# Patient Record
Sex: MALE | Race: WHITE | NOT HISPANIC OR LATINO | Employment: FULL TIME | URBAN - METROPOLITAN AREA
[De-identification: names, ages, dates, MRNs, and addresses within clinical notes are randomized per-mention and may not be internally consistent; named-entity substitution may affect disease eponyms.]

---

## 2019-03-15 ENCOUNTER — HOSPITAL ENCOUNTER (OUTPATIENT)
Dept: URGENT CARE | Facility: CLINIC | Age: 34
Discharge: HOME | End: 2019-03-15

## 2019-03-17 LAB — BACTERIA SPEC AEROBE CULT: NORMAL

## 2020-07-20 ENCOUNTER — HOSPITAL ENCOUNTER (OUTPATIENT)
Dept: FAMILY MEDICINE CLINIC | Facility: CLINIC | Age: 35
Discharge: HOME | End: 2020-07-20
Attending: FAMILY MEDICINE

## 2020-07-21 LAB — SARS-COV-2 RNA SPEC QL NAA+PROBE: DETECTED

## 2021-11-15 ENCOUNTER — OFFICE VISIT (OUTPATIENT)
Dept: INTERNAL MEDICINE | Facility: CLINIC | Age: 36
End: 2021-11-15

## 2021-11-15 VITALS
DIASTOLIC BLOOD PRESSURE: 76 MMHG | SYSTOLIC BLOOD PRESSURE: 122 MMHG | RESPIRATION RATE: 12 BRPM | TEMPERATURE: 98 F | WEIGHT: 168 LBS | HEIGHT: 71 IN | OXYGEN SATURATION: 98 % | BODY MASS INDEX: 23.52 KG/M2 | HEART RATE: 98 BPM

## 2021-11-15 DIAGNOSIS — Z13.220 SCREENING, LIPID: ICD-10-CM

## 2021-11-15 DIAGNOSIS — R13.10 DYSPHAGIA, UNSPECIFIED TYPE: ICD-10-CM

## 2021-11-15 DIAGNOSIS — K20.0 EOSINOPHILIC ESOPHAGITIS: Primary | ICD-10-CM

## 2021-11-15 DIAGNOSIS — K64.9 HEMORRHOIDS, UNSPECIFIED HEMORRHOID TYPE: ICD-10-CM

## 2021-11-15 DIAGNOSIS — Z11.59 NEED FOR HEPATITIS C SCREENING TEST: ICD-10-CM

## 2021-11-15 LAB
ALBUMIN SERPL-MCNC: 5.2 G/DL (ref 3.5–5.2)
ALBUMIN/GLOB SERPL: 2.2 G/DL
ALP SERPL-CCNC: 68 U/L (ref 39–117)
ALT SERPL W P-5'-P-CCNC: 20 U/L (ref 1–41)
ANION GAP SERPL CALCULATED.3IONS-SCNC: 6.9 MMOL/L (ref 5–15)
AST SERPL-CCNC: 13 U/L (ref 1–40)
BASOPHILS # BLD AUTO: 0.12 10*3/MM3 (ref 0–0.2)
BASOPHILS NFR BLD AUTO: 1.9 % (ref 0–1.5)
BILIRUB SERPL-MCNC: 0.3 MG/DL (ref 0–1.2)
BUN SERPL-MCNC: 9 MG/DL (ref 6–20)
BUN/CREAT SERPL: 9.4 (ref 7–25)
CALCIUM SPEC-SCNC: 10.1 MG/DL (ref 8.6–10.5)
CHLORIDE SERPL-SCNC: 101 MMOL/L (ref 98–107)
CHOLEST SERPL-MCNC: 169 MG/DL (ref 0–200)
CO2 SERPL-SCNC: 29.1 MMOL/L (ref 22–29)
CREAT SERPL-MCNC: 0.96 MG/DL (ref 0.76–1.27)
DEPRECATED RDW RBC AUTO: 39.4 FL (ref 37–54)
EOSINOPHIL # BLD AUTO: 0.33 10*3/MM3 (ref 0–0.4)
EOSINOPHIL NFR BLD AUTO: 5.2 % (ref 0.3–6.2)
ERYTHROCYTE [DISTWIDTH] IN BLOOD BY AUTOMATED COUNT: 12.1 % (ref 12.3–15.4)
GFR SERPL CREATININE-BSD FRML MDRD: 88 ML/MIN/1.73
GLOBULIN UR ELPH-MCNC: 2.4 GM/DL
GLUCOSE SERPL-MCNC: 107 MG/DL (ref 65–99)
HCT VFR BLD AUTO: 46.9 % (ref 37.5–51)
HCV AB SER DONR QL: NORMAL
HDLC SERPL-MCNC: 55 MG/DL (ref 40–60)
HGB BLD-MCNC: 16.1 G/DL (ref 13–17.7)
IMM GRANULOCYTES # BLD AUTO: 0.01 10*3/MM3 (ref 0–0.05)
IMM GRANULOCYTES NFR BLD AUTO: 0.2 % (ref 0–0.5)
LDLC SERPL CALC-MCNC: 97 MG/DL (ref 0–100)
LDLC/HDLC SERPL: 1.74 {RATIO}
LYMPHOCYTES # BLD AUTO: 1.74 10*3/MM3 (ref 0.7–3.1)
LYMPHOCYTES NFR BLD AUTO: 27.4 % (ref 19.6–45.3)
MCH RBC QN AUTO: 30.6 PG (ref 26.6–33)
MCHC RBC AUTO-ENTMCNC: 34.3 G/DL (ref 31.5–35.7)
MCV RBC AUTO: 89.2 FL (ref 79–97)
MONOCYTES # BLD AUTO: 0.38 10*3/MM3 (ref 0.1–0.9)
MONOCYTES NFR BLD AUTO: 6 % (ref 5–12)
NEUTROPHILS NFR BLD AUTO: 3.76 10*3/MM3 (ref 1.7–7)
NEUTROPHILS NFR BLD AUTO: 59.3 % (ref 42.7–76)
NRBC BLD AUTO-RTO: 0 /100 WBC (ref 0–0.2)
PLATELET # BLD AUTO: 288 10*3/MM3 (ref 140–450)
PMV BLD AUTO: 10.5 FL (ref 6–12)
POTASSIUM SERPL-SCNC: 4.3 MMOL/L (ref 3.5–5.2)
PROT SERPL-MCNC: 7.6 G/DL (ref 6–8.5)
RBC # BLD AUTO: 5.26 10*6/MM3 (ref 4.14–5.8)
SODIUM SERPL-SCNC: 137 MMOL/L (ref 136–145)
TRIGL SERPL-MCNC: 91 MG/DL (ref 0–150)
VLDLC SERPL-MCNC: 17 MG/DL (ref 5–40)
WBC # BLD AUTO: 6.34 10*3/MM3 (ref 3.4–10.8)

## 2021-11-15 PROCEDURE — 85025 COMPLETE CBC W/AUTO DIFF WBC: CPT | Performed by: INTERNAL MEDICINE

## 2021-11-15 PROCEDURE — 84443 ASSAY THYROID STIM HORMONE: CPT | Performed by: INTERNAL MEDICINE

## 2021-11-15 PROCEDURE — 80053 COMPREHEN METABOLIC PANEL: CPT | Performed by: INTERNAL MEDICINE

## 2021-11-15 PROCEDURE — 99203 OFFICE O/P NEW LOW 30 MIN: CPT | Performed by: INTERNAL MEDICINE

## 2021-11-15 PROCEDURE — 80061 LIPID PANEL: CPT | Performed by: INTERNAL MEDICINE

## 2021-11-15 PROCEDURE — 86803 HEPATITIS C AB TEST: CPT | Performed by: INTERNAL MEDICINE

## 2021-11-15 NOTE — PROGRESS NOTES
"Chief Complaint  Establish Care and Allergic Rhinitis    Subjective          Sandeep Beavers presents to [facility]  History of Present Illness    Hx of dysphagia  Had EGD/dysphagia  Scope came back as allergies  Was told that he needed to do shots , he wasn't able to at the time due to work    Feels like symptoms are return and he has a bit of trouble and needs to vomit it back up at times    Also dealing with a hemorrhoid  It doesn't really bother him.    Noticed it with wiping  Only bleeds every once     No family hx of colon or prostate cancer        Objective   Vital Signs:   /76   Pulse 98   Temp 98 °F (36.7 °C)   Resp 12   Ht 180.3 cm (71\")   Wt 76.2 kg (168 lb)   SpO2 98%   BMI 23.43 kg/m²     Physical Exam  Vitals reviewed.   Constitutional:       Appearance: Normal appearance. He is well-developed.   HENT:      Head: Normocephalic and atraumatic.      Right Ear: External ear normal.      Left Ear: External ear normal.   Eyes:      Conjunctiva/sclera: Conjunctivae normal.      Pupils: Pupils are equal, round, and reactive to light.   Cardiovascular:      Rate and Rhythm: Normal rate and regular rhythm.      Heart sounds: No murmur heard.  No friction rub. No gallop.    Pulmonary:      Effort: Pulmonary effort is normal.      Breath sounds: Normal breath sounds. No wheezing or rhonchi.   Genitourinary:     Rectum: Normal.      Comments: 0.75cm hemorrhoid that is soft in nature   Skin:     General: Skin is warm and dry.   Neurological:      Mental Status: He is alert and oriented to person, place, and time.      Cranial Nerves: No cranial nerve deficit.   Psychiatric:         Mood and Affect: Affect normal.         Behavior: Behavior normal.         Thought Content: Thought content normal.        Result Review :     Common labs    Common Labsle 11/15/21 11/15/21 11/15/21    1540 1540 1540   Glucose  107 (A)    BUN  9    Creatinine  0.96    eGFR " Non African Am  88    Sodium  137    Potassium  4.3    Chloride  101    Calcium  10.1    Albumin  5.20    Total Bilirubin  0.3    Alkaline Phosphatase  68    AST (SGOT)  13    ALT (SGPT)  20    WBC 6.34     Hemoglobin 16.1     Hematocrit 46.9     Platelets 288     Total Cholesterol   169   Triglycerides   91   HDL Cholesterol   55   LDL Cholesterol    97   (A) Abnormal value               Procedures      Assessment and Plan    Diagnoses and all orders for this visit:    1. Eosinophilic esophagitis (Primary)  Comments:  Will refer to GI for further work-up will also refer to allergy for further work-up  Orders:  -     Ambulatory Referral to Gastroenterology  -     Ambulatory Referral to Allergy  -     Comprehensive Metabolic Panel  -     CBC & Differential  -     TSH    2. Screening, lipid  -     Lipid Panel    3. Need for hepatitis C screening test  -     Hepatitis C Antibody; Future  -     Hepatitis C Antibody    4. Dysphagia, unspecified type  Comments:  May benefit from scope we will see what GI thinks    5. Hemorrhoids, unspecified hemorrhoid type  Comments:  Do not appear irritated right now he will continue to monitor for growth does not appear worrisome at this time              Follow Up   Return in about 2 months (around 1/15/2022).  Patient was given instructions and counseling regarding his condition or for health maintenance advice. Please see specific information pulled into the AVS if appropriate.

## 2021-11-16 LAB — TSH SERPL DL<=0.05 MIU/L-ACNC: 2.01 UIU/ML (ref 0.27–4.2)

## 2021-12-06 ENCOUNTER — PATIENT MESSAGE (OUTPATIENT)
Dept: INTERNAL MEDICINE | Facility: CLINIC | Age: 36
End: 2021-12-06

## 2021-12-07 NOTE — TELEPHONE ENCOUNTER
From: Sandeep Beavers  To: Debbi Preston MD  Sent: 12/6/2021 7:57 AM EST  Subject: Referral for Gastro      Good morning I got scheduled for the gastro Dr last week for December 15 but they scheduled me with Dr Mann instead of Dr. Sanchez. Just wanted to make sure this is ok? Thanks and have a good day.

## 2021-12-15 ENCOUNTER — LAB (OUTPATIENT)
Dept: LAB | Facility: HOSPITAL | Age: 36
End: 2021-12-15

## 2021-12-15 ENCOUNTER — OFFICE VISIT (OUTPATIENT)
Dept: GASTROENTEROLOGY | Facility: CLINIC | Age: 36
End: 2021-12-15

## 2021-12-15 VITALS
BODY MASS INDEX: 25.21 KG/M2 | SYSTOLIC BLOOD PRESSURE: 133 MMHG | HEART RATE: 105 BPM | HEIGHT: 70 IN | DIASTOLIC BLOOD PRESSURE: 83 MMHG | WEIGHT: 176.1 LBS

## 2021-12-15 DIAGNOSIS — K20.0 ESOPHAGITIS, EOSINOPHILIC: ICD-10-CM

## 2021-12-15 DIAGNOSIS — R13.13 PHARYNGEAL DYSPHAGIA: ICD-10-CM

## 2021-12-15 DIAGNOSIS — K62.5 RECTAL BLEEDING: Primary | ICD-10-CM

## 2021-12-15 PROCEDURE — 86003 ALLG SPEC IGE CRUDE XTRC EA: CPT

## 2021-12-15 PROCEDURE — 99204 OFFICE O/P NEW MOD 45 MIN: CPT | Performed by: NURSE PRACTITIONER

## 2021-12-15 PROCEDURE — 36415 COLL VENOUS BLD VENIPUNCTURE: CPT

## 2021-12-15 RX ORDER — SOD SULF/POT CHLORIDE/MAG SULF 1.479 G
12 TABLET ORAL TAKE AS DIRECTED
Qty: 24 TABLET | Refills: 0 | Status: ON HOLD | OUTPATIENT
Start: 2021-12-15 | End: 2022-03-18

## 2021-12-15 RX ORDER — BUDESONIDE 0.5 MG/2ML
0.5 INHALANT ORAL
Qty: 120 EACH | Refills: 0 | Status: SHIPPED | OUTPATIENT
Start: 2021-12-15 | End: 2022-03-14

## 2021-12-15 NOTE — PROGRESS NOTES
"Patient Name: Sandeep Beavers   Visit Date: 12/15/2021   Patient ID: [id number]  Provider: FIDEL Reid    Sex: male  Location:  Location Address:  Location Phone: [address] [place] [address] [phone]    YOB: 1985      Primary Care Provider Provider, No Known      Referring Provider: No ref. provider found        Chief Complaint  Difficulty Swallowing    History of Present Illness  Sandeep Beavers is a 36 y.o. who presents to [facility] on referral from No ref. provider found for a gastroenterology evaluation of Difficulty Swallowing.    Mr. Beavers presents today due to pharyngeal dysphagia with solids and liquids. Admits that food has become stuck before and he will make himself vomit in order to dislodge food. Example is pizza, hamburger, taco, etc. Previously had an EGD 12+ years ago with [facility] and was diagnosed with eosinophilic esophagitis.  Requesting records. Reports he has a long list of environmental allergies and unaware of any food allergens.  Previously seen allergy and it was recommended that he had allergen shots however was not convenient at the time due to his occupation however would like to get back in and see allergies.  Taking Zyrtec daily. Previously used budesonide slurry which did resolve dysphagia.  Denies any heartburn, nausea, vomiting, change in appetite, or weight loss.    Patient reports he is also having bright red rectal bleeding on and off for the last several years.  Bowel movement daily, formed stool.  Feels that he has a history of hemorrhoids however they are not \"flaring\" and he does not have rectal pain.  Unsure where blood is coming from.  Denies any melena or rectal straining.    Labs Result Review Imaging    Past Medical History:   Diagnosis Date   • Asthma due to seasonal allergies        Past Surgical History:   Procedure Laterality Date   • ADENOIDECTOMY     • COLONOSCOPY  2009    " "melba   • TONSILLECTOMY     • UPPER GASTROINTESTINAL ENDOSCOPY  2009    [facility]         Current Outpatient Medications:   •  budesonide (Pulmicort) 0.5 MG/2ML nebulizer solution, Take 2 mL by nebulization Daily. Mix 2 respules with 10 packets of splenda and drink BID. NPO 30 mins after drinking. For EOE, Disp: 120 each, Rfl: 0  •  cetirizine (zyrTEC) 10 MG tablet, Take 10 mg by mouth Daily., Disp: , Rfl:   •  Sodium Sulfate-Mag Sulfate-KCl (Sutab) 4140-365-502 MG tablet, Take 12 tablets by mouth Take As Directed. Take 12 tablets at 6 pm and 12 tablets 4 hours prior to procedure., Disp: 24 tablet, Rfl: 0     Allergies   Allergen Reactions   • Erythromycin Unknown - High Severity       History reviewed. No pertinent family history.     Social History     Social History Narrative   • Not on file         Objective     Review of Systems   Gastrointestinal: Positive for anal bleeding.        Vital Signs:   /83 (BP Location: Left arm, Patient Position: Sitting, Cuff Size: Adult)   Pulse 105   Ht 177.8 cm (70\")   Wt 79.9 kg (176 lb 1.6 oz)   BMI 25.27 kg/m²       Physical Exam  Constitutional:       General: He is not in acute distress.     Appearance: Normal appearance. He is well-developed and normal weight.   HENT:      Head: Normocephalic and atraumatic.   Eyes:      Conjunctiva/sclera: Conjunctivae normal.      Pupils: Pupils are equal, round, and reactive to light.      Visual Fields: Right eye visual fields normal and left eye visual fields normal.   Cardiovascular:      Rate and Rhythm: Normal rate and regular rhythm.      Heart sounds: Normal heart sounds.   Pulmonary:      Effort: Pulmonary effort is normal. No retractions.      Breath sounds: Normal breath sounds and air entry.   Abdominal:      General: Bowel sounds are normal. There is no distension.      Palpations: Abdomen is soft.      Tenderness: There is no abdominal tenderness.      Comments: No appreciable hepatosplenomegaly or ascites "   Musculoskeletal:         General: Normal range of motion.      Cervical back: Normal range of motion and neck supple.      Right lower leg: No edema.      Left lower leg: No edema.   Lymphadenopathy:      Cervical: No cervical adenopathy.   Skin:     General: Skin is warm and dry.      Findings: No lesion.   Neurological:      General: No focal deficit present.      Mental Status: He is alert and oriented to person, place, and time.   Psychiatric:         Mood and Affect: Mood and affect normal.         Behavior: Behavior normal.         Result Review :   The following data was reviewed by: FIDEL Reid on 12/15/2021:  CMP    CMP 11/15/21   Glucose 107 (A)   BUN 9   Creatinine 0.96   eGFR Non African Am 88   Sodium 137   Potassium 4.3   Chloride 101   Calcium 10.1   Albumin 5.20   Total Bilirubin 0.3   Alkaline Phosphatase 68   AST (SGOT) 13   ALT (SGPT) 20   (A) Abnormal value            CBC w/diff    CBC w/Diff 11/15/21   WBC 6.34   RBC 5.26   Hemoglobin 16.1   Hematocrit 46.9   MCV 89.2   MCH 30.6   MCHC 34.3   RDW 12.1 (A)   Platelets 288   Neutrophil Rel % 59.3   Immature Granulocyte Rel % 0.2   Lymphocyte Rel % 27.4   Monocyte Rel % 6.0   Eosinophil Rel % 5.2   Basophil Rel % 1.9 (A)   (A) Abnormal value            No results found for: IRON, TIBC, FERRITIN, LABIRON           Assessment and Plan    Diagnoses and all orders for this visit:    1. Rectal bleeding (Primary)  -     Case Request; Standing  -     Case Request    2. Pharyngeal dysphagia  -     Case Request; Standing  -     Case Request    3. Esophagitis, eosinophilic  -     Ambulatory Referral to Allergy  -     Food Allergy Profile; Future    Other orders  -     Follow Anesthesia Guidelines / Protocol; Future  -     Obtain Informed Consent; Future  -     Sodium Sulfate-Mag Sulfate-KCl (Sutab) 1452-224-944 MG tablet; Take 12 tablets by mouth Take As Directed. Take 12 tablets at 6 pm and 12 tablets 4 hours prior to procedure.  Dispense: 24  tablet; Refill: 0  -     budesonide (Pulmicort) 0.5 MG/2ML nebulizer solution; Take 2 mL by nebulization Daily. Mix 2 respules with 10 packets of splenda and drink BID. NPO 30 mins after drinking. For EOE  Dispense: 120 each; Refill: 0      ESOPHAGOGASTRODUODENOSCOPY (N/A), COLONOSCOPY (N/A)       Follow Up   Return for Follow up after procedure.  Patient was given instructions and counseling regarding his condition or for health maintenance advice. Please see specific information pulled into the AVS if appropriate.

## 2021-12-16 RX ORDER — BUDESONIDE 0.5 MG/2ML
0.5 INHALANT ORAL
Qty: 120 EACH | Refills: 0 | OUTPATIENT
Start: 2021-12-16

## 2021-12-18 LAB
CLAM IGE QN: <0.1 KU/L
CODFISH IGE QN: <0.1 KU/L
CONV CLASS DESCRIPTION: ABNORMAL
CORN IGE QN: <0.1 KU/L
COW MILK IGE QN: <0.1 KU/L
EGG WHITE IGE QN: 0.14 KU/L
PEANUT IGE QN: <0.1 KU/L
SCALLOP IGE QN: <0.1 KU/L
SESAME SEED IGE QN: 0.11 KU/L
SHRIMP IGE QN: <0.1 KU/L
SOYBEAN IGE QN: <0.1 KU/L
WALNUT IGE QN: <0.1 KU/L
WHEAT IGE QN: <0.1 KU/L

## 2021-12-20 ENCOUNTER — TELEPHONE (OUTPATIENT)
Dept: GASTROENTEROLOGY | Facility: CLINIC | Age: 36
End: 2021-12-20

## 2021-12-20 NOTE — TELEPHONE ENCOUNTER
----- Message from FIDEL Reid sent at 12/20/2021 10:08 AM EST -----  Please let patient know that he has a low allergan to egg whites and sesame seed

## 2022-03-11 ENCOUNTER — TELEPHONE (OUTPATIENT)
Dept: GASTROENTEROLOGY | Facility: CLINIC | Age: 37
End: 2022-03-11

## 2022-03-14 NOTE — PRE-PROCEDURE INSTRUCTIONS
Called patient to discuss instructions for procedure and arrival time. Discussed prep, clear liquid diet, and pre-op medications.   Patient aware they can take meds with sip of water until 4am.  Patient stated understanding. No issues or concerns noted currently per patient.  Patient is vaccinated for Covid-19 x 2 doses, and will bring vaccination card/photo I.D./Insurance cards with them.

## 2022-03-18 ENCOUNTER — ANESTHESIA EVENT (OUTPATIENT)
Dept: GASTROENTEROLOGY | Facility: HOSPITAL | Age: 37
End: 2022-03-18

## 2022-03-18 ENCOUNTER — ANESTHESIA (OUTPATIENT)
Dept: GASTROENTEROLOGY | Facility: HOSPITAL | Age: 37
End: 2022-03-18

## 2022-03-18 ENCOUNTER — HOSPITAL ENCOUNTER (OUTPATIENT)
Facility: HOSPITAL | Age: 37
Setting detail: HOSPITAL OUTPATIENT SURGERY
Discharge: HOME | End: 2022-03-18
Attending: INTERNAL MEDICINE | Admitting: INTERNAL MEDICINE

## 2022-03-18 VITALS
RESPIRATION RATE: 16 BRPM | SYSTOLIC BLOOD PRESSURE: 122 MMHG | HEART RATE: 91 BPM | OXYGEN SATURATION: 100 % | TEMPERATURE: 98 F | HEIGHT: 70 IN | WEIGHT: 164.46 LBS | BODY MASS INDEX: 23.55 KG/M2 | DIASTOLIC BLOOD PRESSURE: 85 MMHG

## 2022-03-18 DIAGNOSIS — K62.5 RECTAL BLEEDING: ICD-10-CM

## 2022-03-18 DIAGNOSIS — R13.13 PHARYNGEAL DYSPHAGIA: ICD-10-CM

## 2022-03-18 PROCEDURE — 88305 TISSUE EXAM BY PATHOLOGIST: CPT | Performed by: INTERNAL MEDICINE

## 2022-03-18 PROCEDURE — 43239 EGD BIOPSY SINGLE/MULTIPLE: CPT | Performed by: INTERNAL MEDICINE

## 2022-03-18 PROCEDURE — 25010000002 PROPOFOL 10 MG/ML EMULSION: Performed by: NURSE ANESTHETIST, CERTIFIED REGISTERED

## 2022-03-18 PROCEDURE — C1726 CATH, BAL DIL, NON-VASCULAR: HCPCS | Performed by: INTERNAL MEDICINE

## 2022-03-18 PROCEDURE — 43249 ESOPH EGD DILATION <30 MM: CPT | Performed by: INTERNAL MEDICINE

## 2022-03-18 PROCEDURE — 45380 COLONOSCOPY AND BIOPSY: CPT | Performed by: INTERNAL MEDICINE

## 2022-03-18 RX ORDER — SODIUM CHLORIDE, SODIUM LACTATE, POTASSIUM CHLORIDE, CALCIUM CHLORIDE 600; 310; 30; 20 MG/100ML; MG/100ML; MG/100ML; MG/100ML
30 INJECTION, SOLUTION INTRAVENOUS CONTINUOUS
Status: DISCONTINUED | OUTPATIENT
Start: 2022-03-18 | End: 2022-03-18 | Stop reason: HOSPADM

## 2022-03-18 RX ORDER — PANTOPRAZOLE SODIUM 20 MG/1
20 TABLET, DELAYED RELEASE ORAL DAILY
Qty: 30 TABLET | Refills: 1 | Status: SHIPPED | OUTPATIENT
Start: 2022-03-18 | End: 2022-03-18 | Stop reason: SDUPTHER

## 2022-03-18 RX ORDER — LIDOCAINE HYDROCHLORIDE 20 MG/ML
INJECTION, SOLUTION INFILTRATION; PERINEURAL AS NEEDED
Status: DISCONTINUED | OUTPATIENT
Start: 2022-03-18 | End: 2022-03-18 | Stop reason: SURG

## 2022-03-18 RX ORDER — PROPOFOL 10 MG/ML
VIAL (ML) INTRAVENOUS AS NEEDED
Status: DISCONTINUED | OUTPATIENT
Start: 2022-03-18 | End: 2022-03-18 | Stop reason: SURG

## 2022-03-18 RX ORDER — PANTOPRAZOLE SODIUM 20 MG/1
20 TABLET, DELAYED RELEASE ORAL DAILY
Qty: 90 TABLET | Refills: 1 | Status: SHIPPED | OUTPATIENT
Start: 2022-03-18 | End: 2022-11-01 | Stop reason: SDUPTHER

## 2022-03-18 RX ADMIN — PROPOFOL 250 MCG/KG/MIN: 10 INJECTION, EMULSION INTRAVENOUS at 07:31

## 2022-03-18 RX ADMIN — PROPOFOL 100 MG: 10 INJECTION, EMULSION INTRAVENOUS at 07:31

## 2022-03-18 RX ADMIN — LIDOCAINE HYDROCHLORIDE 50 MG: 20 INJECTION, SOLUTION INFILTRATION; PERINEURAL at 07:31

## 2022-03-18 RX ADMIN — SODIUM CHLORIDE, POTASSIUM CHLORIDE, SODIUM LACTATE AND CALCIUM CHLORIDE 30 ML/HR: 600; 310; 30; 20 INJECTION, SOLUTION INTRAVENOUS at 06:42

## 2022-03-18 NOTE — H&P
"Pre Procedure History & Physical    Chief Complaint:   Dysphagia, rectal bleeding    Subjective     HPI:   37 yo M here for eval of dysphagia, rectal bleeding.    Past Medical History:   History reviewed. No pertinent past medical history.    Past Surgical History:  Past Surgical History:   Procedure Laterality Date   • ADENOIDECTOMY     • COLONOSCOPY  2009 [facility]   • TONSILLECTOMY     • UPPER GASTROINTESTINAL ENDOSCOPY  2009 [facility]       Family History:  History reviewed. No pertinent family history.    Social History:   reports that he has quit smoking. He quit smokeless tobacco use about 7 years ago.  His smokeless tobacco use included chew. He reports current alcohol use. He reports that he does not use drugs.    Medications:   Medications Prior to Admission   Medication Sig Dispense Refill Last Dose   • cetirizine (zyrTEC) 10 MG tablet Take 10 mg by mouth Daily.   3/16/2022 at Unknown time       Allergies:  Erythromycin    ROS:    Pertinent items are noted in HPI     Objective     Blood pressure 140/89, pulse 112, temperature 98.5 °F (36.9 °C), temperature source Core, resp. rate 18, height 177.8 cm (70\"), weight 74.6 kg (164 lb 7.4 oz), SpO2 99 %.    Physical Exam   Constitutional: Pt is oriented to person, place, and time and well-developed, well-nourished, and in no distress.   Mouth/Throat: Oropharynx is clear and moist.   Neck: Normal range of motion.   Cardiovascular: Normal rate, regular rhythm and normal heart sounds.    Pulmonary/Chest: Effort normal and breath sounds normal.   Abdominal: Soft. Nontender  Skin: Skin is warm and dry.   Psychiatric: Mood, memory, affect and judgment normal.     Assessment/Plan     Diagnosis:  Dysphagia, rectal bleeding    Anticipated Surgical Procedure:  EGD/colonoscopy    The risks, benefits, and alternatives of this procedure have been discussed with the patient or the responsible party- the patient understands and agrees to proceed.          "

## 2022-03-18 NOTE — ANESTHESIA POSTPROCEDURE EVALUATION
Patient: Sandeep Beavers    Procedure Summary     Date: 03/18/22 Room / Location: formerly [facility] ENDOSCOPY 4 / formerly [facility] ENDOSCOPY    Anesthesia Start: 0728 Anesthesia Stop: 0809    Procedures:       ESOPHAGOGASTRODUODENOSCOPY WITH BIOPSIES, BALLOON DILIATATION TO 13.5MM (N/A )      COLONOSCOPY WITH BIOPSY (N/A ) Diagnosis:       Rectal bleeding      Pharyngeal dysphagia      (Rectal bleeding [K62.5])      (Pharyngeal dysphagia [R13.13])    Surgeons: Ely Sanchez MD Provider: Sole Giraldo DO    Anesthesia Type: general ASA Status: 2          Anesthesia Type: general    Vitals  Vitals Value Taken Time   /85 03/18/22 0830   Temp 36.7 °C (98 °F) 03/18/22 0809   Pulse 94 03/18/22 0831   Resp 16 03/18/22 0830   SpO2 97 % 03/18/22 0831   Vitals shown include unvalidated device data.        Post Anesthesia Care and Evaluation    Patient location during evaluation: bedside  Patient participation: complete - patient participated  Level of consciousness: awake  Pain management: adequate  Airway patency: patent  Anesthetic complications: No anesthetic complications  PONV Status: none  Cardiovascular status: acceptable and stable  Respiratory status: acceptable  Hydration status: acceptable    Comments: An Anesthesiologist personally participated in the most demanding procedures (including induction and emergence if applicable) in the anesthesia plan, monitored the course of anesthesia administration at frequent intervals and remained physically present and available for immediate diagnosis and treatment of emergencies.

## 2022-03-18 NOTE — ANESTHESIA PREPROCEDURE EVALUATION
Anesthesia Evaluation     Patient summary reviewed and Nursing notes reviewed   no history of anesthetic complications:  NPO Solid Status: > 8 hours  NPO Liquid Status: > 2 hours           Airway   Mallampati: II  TM distance: >3 FB  Neck ROM: full  No difficulty expected  Dental - normal exam     Pulmonary - normal exam   (+) a smoker Former,   Cardiovascular - negative cardio ROS and normal exam  Exercise tolerance: good (4-7 METS)    ECG reviewed        Neuro/Psych- negative ROS  GI/Hepatic/Renal/Endo    (+)  GI bleeding lower ,     ROS Comment: dysphagia    Musculoskeletal (-) negative ROS    Abdominal  - normal exam   Substance History - negative use     OB/GYN negative ob/gyn ROS         Other - negative ROS                       Anesthesia Plan    ASA 2     general   (Total IV Anesthesia    Patient understands anesthesia not responsible for dental damage.  )  intravenous induction     Anesthetic plan, all risks, benefits, and alternatives have been provided, discussed and informed consent has been obtained with: patient.    Plan discussed with CRNA.        CODE STATUS:

## 2022-03-21 ENCOUNTER — TELEPHONE (OUTPATIENT)
Dept: GASTROENTEROLOGY | Facility: CLINIC | Age: 37
End: 2022-03-21

## 2022-03-21 LAB
CYTO UR: NORMAL
LAB AP CASE REPORT: NORMAL
LAB AP CLINICAL INFORMATION: NORMAL
PATH REPORT.FINAL DX SPEC: NORMAL
PATH REPORT.GROSS SPEC: NORMAL

## 2022-03-21 NOTE — TELEPHONE ENCOUNTER
Spoke to pt and informed of Spencer PARRA note. F/U scheduled on 08/10/2022 at 1000. Pt verified understanding. Apt remainder mailed to pt.     5 year colon recall placed.

## 2022-03-21 NOTE — TELEPHONE ENCOUNTER
----- Message from FIDEL Reid sent at 3/21/2022  3:28 PM EDT -----  Please place patient in colon recall for 5 years.  Stomach biopsy consistent with reactive gastritis.  Esophageal biopsies consistent with reflux esophagitis.  Continue PPI and avoid NSAIDs.  Please make sure patient has a follow-up scheduled.

## 2022-04-15 ENCOUNTER — OFFICE VISIT (OUTPATIENT)
Dept: INTERNAL MEDICINE | Facility: CLINIC | Age: 37
End: 2022-04-15

## 2022-04-15 VITALS
BODY MASS INDEX: 24.34 KG/M2 | TEMPERATURE: 96.7 F | HEIGHT: 70 IN | OXYGEN SATURATION: 98 % | WEIGHT: 170 LBS | DIASTOLIC BLOOD PRESSURE: 72 MMHG | RESPIRATION RATE: 18 BRPM | SYSTOLIC BLOOD PRESSURE: 122 MMHG | HEART RATE: 104 BPM

## 2022-04-15 DIAGNOSIS — J30.9 ALLERGIC RHINITIS, UNSPECIFIED SEASONALITY, UNSPECIFIED TRIGGER: ICD-10-CM

## 2022-04-15 DIAGNOSIS — R13.13 PHARYNGEAL DYSPHAGIA: Primary | ICD-10-CM

## 2022-04-15 PROCEDURE — 99213 OFFICE O/P EST LOW 20 MIN: CPT | Performed by: INTERNAL MEDICINE

## 2022-04-15 NOTE — PROGRESS NOTES
"Chief Complaint  Follow-up (UPPER GI ENDOSCOPY and Colonoscopy )    Subjective          Sandeep Beavers presents to [facility]  History of Present Illness     Reviewed results from colonoscopy/endoscopy  Tolerated procedures well  Pathology looked good    Prior blood work looked excellent    He has started the allergy shots    Swallowing is totally normal at this point    No rectal bleeding no growth of hemorrhoids and they do not bother him    Has tolerated Protonix well  Doing well on his allergy shots and taking his allergy medicines daily  States he mowed the grass recently and did not notice any problems with that or maybe it was not as bad as prior        Objective   Vital Signs:   /72 (BP Location: Left arm, Patient Position: Sitting, Cuff Size: Adult)   Pulse 104   Temp 96.7 °F (35.9 °C)   Resp 18   Ht 177.8 cm (70\")   Wt 77.1 kg (170 lb)   SpO2 98%   BMI 24.39 kg/m²     Physical Exam  Vitals reviewed.   Constitutional:       Appearance: Normal appearance. He is well-developed.   HENT:      Head: Normocephalic and atraumatic.      Right Ear: External ear normal.      Left Ear: External ear normal.   Eyes:      Conjunctiva/sclera: Conjunctivae normal.      Pupils: Pupils are equal, round, and reactive to light.   Cardiovascular:      Rate and Rhythm: Normal rate and regular rhythm.      Heart sounds: No murmur heard.    No friction rub. No gallop.   Pulmonary:      Effort: Pulmonary effort is normal.      Breath sounds: Normal breath sounds. No wheezing or rhonchi.   Skin:     General: Skin is warm and dry.   Neurological:      Mental Status: He is alert and oriented to person, place, and time.      Cranial Nerves: No cranial nerve deficit.   Psychiatric:         Mood and Affect: Affect normal.         Behavior: Behavior normal.         Thought Content: Thought content normal.        Result Review :       Common labs    Common Labsle 11/15/21 " 11/15/21 11/15/21    1540 1540 1540   Glucose  107 (A)    BUN  9    Creatinine  0.96    eGFR Non African Am  88    Sodium  137    Potassium  4.3    Chloride  101    Calcium  10.1    Albumin  5.20    Total Bilirubin  0.3    Alkaline Phosphatase  68    AST (SGOT)  13    ALT (SGPT)  20    WBC 6.34     Hemoglobin 16.1     Hematocrit 46.9     Platelets 288     Total Cholesterol   169   Triglycerides   91   HDL Cholesterol   55   LDL Cholesterol    97   (A) Abnormal value            \plain  Results for orders placed or performed during the hospital encounter of 03/18/22   Tissue Pathology Exam    Specimen: A: Large Intestine, Right / Ascending Colon; Polyp    B: Gastric, Antrum; Polyp    C: GE Junction; Polyp    D: Esophagus, Mid; Polyp   Result Value Ref Range    Case Report       Surgical Pathology Report                         Case: [id number]                                  Authorizing Provider:  Ely Sanchez MD Collected:           03/18/2022 07:40 AM          Ordering Location:     [facility] Received:            03/18/2022 08:37 AM                                 SUITES                                                                       Pathologist:           Carrol Rawls DO                                                       Specimens:   1) - Large Intestine, Right / Ascending Colon, ASCENDING COLON POLYP BX                             2) - Gastric, Antrum, ANTRUM BX                                                                     3) - GE Junction, GE JUNCTION BX                                                                    4) - Esophagus, Mid, MID ESOPHAGUS BX                                                      Clinical Information      Final Diagnosis       1. Ascending colon polyp, biopsy:    - Sessile serrated lesion      2. Stomach, antrum, biopsy:    - Gastric antrum mucosa with mild reactive gastropathy    - Negative for Helicobacter pylori on routine H&E  stain    - Negative for intestinal metaplasia, dysplasia and malignancy      3. Gastroesophageal junction, biopsy:    - Squamous mucosa with changes consistent with reflux esophagitis    - Negative for intestinal metaplasia, dysplasia and malignancy      4. Mid esophagus, biopsy:    - Squamous mucosa with changes consistent with reflux esophagitis    - Negative for eosinophilic esophagitis          Gross Description       1. Large Intestine, Right / Ascending Colon.  Ascending colon polyp biopsy: Received in formalin are 3 irregular fragments of light tan feathery friable soft tissue filtered measuring 0.6 cm in greatest aggregate dimension.  All 1A.      2. Gastric, Antrum.  Antrum biopsy: Received in formalin are 2 irregular fragments of light tan feathery friable soft tissue filtered and measuring 0.4 cm in greatest aggregate dimension.  All 2A.      3. GE Junction.  GE junction biopsy: Received in formalin are 2 irregular fragments of white semitranslucent soft tissue filtered and measuring 0.5 cm in greatest aggregate dimension.  All 3A.      4. Esophagus, Mid.  Mid esophagus biopsy: Received in formalin are 2 irregular fragments of white semitranslucent friable soft tissue filtered and measuring 0.3 cm in greatest aggregate.  All 4A.  CRE          Microscopic Description              Procedures        Assessment and Plan    Diagnoses and all orders for this visit:    1. Pharyngeal dysphagia (Primary)  Assessment & Plan:  Resolved since esophageal stretching continue PPI follow-up with GI      2. Allergic rhinitis, unspecified seasonality, unspecified trigger  Comments:  Doing great on immunotherapy continue for now                Follow Up   Return in about 1 year (around 4/15/2023).  Patient was given instructions and counseling regarding his condition or for health maintenance advice. Please see specific information pulled into the AVS if appropriate.

## 2022-11-01 ENCOUNTER — TELEPHONE (OUTPATIENT)
Dept: GASTROENTEROLOGY | Facility: CLINIC | Age: 37
End: 2022-11-01

## 2022-11-01 RX ORDER — PANTOPRAZOLE SODIUM 20 MG/1
20 TABLET, DELAYED RELEASE ORAL DAILY
Qty: 90 TABLET | Refills: 1 | Status: SHIPPED | OUTPATIENT
Start: 2022-11-01

## 2022-11-01 NOTE — TELEPHONE ENCOUNTER
Spoke with patient regarding needing to reschedule their follow up appointment with Spencer Mann. Patient verbalized understanding and opted to cancel appointment at this time.

## 2023-05-16 ENCOUNTER — OFFICE VISIT (OUTPATIENT)
Dept: INTERNAL MEDICINE | Facility: CLINIC | Age: 38
End: 2023-05-16
Payer: COMMERCIAL

## 2023-05-16 VITALS
HEART RATE: 86 BPM | SYSTOLIC BLOOD PRESSURE: 124 MMHG | WEIGHT: 169 LBS | HEIGHT: 70 IN | DIASTOLIC BLOOD PRESSURE: 74 MMHG | OXYGEN SATURATION: 97 % | TEMPERATURE: 98.7 F | BODY MASS INDEX: 24.2 KG/M2

## 2023-05-16 DIAGNOSIS — R07.9 CHEST PAIN, UNSPECIFIED TYPE: ICD-10-CM

## 2023-05-16 DIAGNOSIS — Z13.220 SCREENING, LIPID: ICD-10-CM

## 2023-05-16 DIAGNOSIS — Z00.00 ANNUAL PHYSICAL EXAM: Primary | ICD-10-CM

## 2023-05-16 DIAGNOSIS — L72.0 EPIDERMAL INCLUSION CYST: ICD-10-CM

## 2023-05-16 DIAGNOSIS — K21.9 GASTROESOPHAGEAL REFLUX DISEASE WITHOUT ESOPHAGITIS: ICD-10-CM

## 2023-05-16 DIAGNOSIS — R13.13 PHARYNGEAL DYSPHAGIA: ICD-10-CM

## 2023-05-16 LAB
ALBUMIN SERPL-MCNC: 5.3 G/DL (ref 3.5–5.2)
ALBUMIN/GLOB SERPL: 1.9 G/DL
ALP SERPL-CCNC: 71 U/L (ref 39–117)
ALT SERPL W P-5'-P-CCNC: 16 U/L (ref 1–41)
ANION GAP SERPL CALCULATED.3IONS-SCNC: 10 MMOL/L (ref 5–15)
AST SERPL-CCNC: 19 U/L (ref 1–40)
BASOPHILS # BLD AUTO: 0.09 10*3/MM3 (ref 0–0.2)
BASOPHILS NFR BLD AUTO: 1.5 % (ref 0–1.5)
BILIRUB SERPL-MCNC: 0.6 MG/DL (ref 0–1.2)
BUN SERPL-MCNC: 16 MG/DL (ref 6–20)
BUN/CREAT SERPL: 15.7 (ref 7–25)
CALCIUM SPEC-SCNC: 10.6 MG/DL (ref 8.6–10.5)
CHLORIDE SERPL-SCNC: 102 MMOL/L (ref 98–107)
CHOLEST SERPL-MCNC: 187 MG/DL (ref 0–200)
CO2 SERPL-SCNC: 28 MMOL/L (ref 22–29)
CREAT SERPL-MCNC: 1.02 MG/DL (ref 0.76–1.27)
DEPRECATED RDW RBC AUTO: 38.3 FL (ref 37–54)
EGFRCR SERPLBLD CKD-EPI 2021: 95.9 ML/MIN/1.73
EOSINOPHIL # BLD AUTO: 0.11 10*3/MM3 (ref 0–0.4)
EOSINOPHIL NFR BLD AUTO: 1.9 % (ref 0.3–6.2)
ERYTHROCYTE [DISTWIDTH] IN BLOOD BY AUTOMATED COUNT: 12.1 % (ref 12.3–15.4)
GLOBULIN UR ELPH-MCNC: 2.8 GM/DL
GLUCOSE SERPL-MCNC: 98 MG/DL (ref 65–99)
HCT VFR BLD AUTO: 48.1 % (ref 37.5–51)
HDLC SERPL-MCNC: 73 MG/DL (ref 40–60)
HGB BLD-MCNC: 16.3 G/DL (ref 13–17.7)
IMM GRANULOCYTES # BLD AUTO: 0.01 10*3/MM3 (ref 0–0.05)
IMM GRANULOCYTES NFR BLD AUTO: 0.2 % (ref 0–0.5)
LDLC SERPL CALC-MCNC: 101 MG/DL (ref 0–100)
LDLC/HDLC SERPL: 1.37 {RATIO}
LYMPHOCYTES # BLD AUTO: 1.31 10*3/MM3 (ref 0.7–3.1)
LYMPHOCYTES NFR BLD AUTO: 22.3 % (ref 19.6–45.3)
MCH RBC QN AUTO: 29.5 PG (ref 26.6–33)
MCHC RBC AUTO-ENTMCNC: 33.9 G/DL (ref 31.5–35.7)
MCV RBC AUTO: 87.1 FL (ref 79–97)
MONOCYTES # BLD AUTO: 0.43 10*3/MM3 (ref 0.1–0.9)
MONOCYTES NFR BLD AUTO: 7.3 % (ref 5–12)
NEUTROPHILS NFR BLD AUTO: 3.92 10*3/MM3 (ref 1.7–7)
NEUTROPHILS NFR BLD AUTO: 66.8 % (ref 42.7–76)
NRBC BLD AUTO-RTO: 0 /100 WBC (ref 0–0.2)
PLATELET # BLD AUTO: 292 10*3/MM3 (ref 140–450)
PMV BLD AUTO: 10.5 FL (ref 6–12)
POTASSIUM SERPL-SCNC: 4.6 MMOL/L (ref 3.5–5.2)
PROT SERPL-MCNC: 8.1 G/DL (ref 6–8.5)
RBC # BLD AUTO: 5.52 10*6/MM3 (ref 4.14–5.8)
SODIUM SERPL-SCNC: 140 MMOL/L (ref 136–145)
TRIGL SERPL-MCNC: 71 MG/DL (ref 0–150)
TROPONIN T SERPL HS-MCNC: 7 NG/L
TSH SERPL DL<=0.05 MIU/L-ACNC: 1.68 UIU/ML (ref 0.27–4.2)
VLDLC SERPL-MCNC: 13 MG/DL (ref 5–40)
WBC NRBC COR # BLD: 5.87 10*3/MM3 (ref 3.4–10.8)

## 2023-05-16 PROCEDURE — 80050 GENERAL HEALTH PANEL: CPT | Performed by: INTERNAL MEDICINE

## 2023-05-16 PROCEDURE — 84484 ASSAY OF TROPONIN QUANT: CPT | Performed by: INTERNAL MEDICINE

## 2023-05-16 PROCEDURE — 80061 LIPID PANEL: CPT | Performed by: INTERNAL MEDICINE

## 2023-05-16 RX ORDER — PANTOPRAZOLE SODIUM 20 MG/1
20 TABLET, DELAYED RELEASE ORAL DAILY
Qty: 90 TABLET | Refills: 1 | Status: SHIPPED | OUTPATIENT
Start: 2023-05-16

## 2023-05-16 NOTE — ASSESSMENT & PLAN NOTE
-Will do EKG in office   -Likely secondary to heartburn. Will monitor, could potentially get worse with discontinuation of PPI.  -Will obtain troponin levels to rule out cardiac stress.

## 2023-05-16 NOTE — ASSESSMENT & PLAN NOTE
Discussed with patient medical history, encouraged patient to continue exercising and healthy diet. Up to date with dental and vision exam.   -Vitals WNR  -Up to date on immunizations   -Up to date on screening

## 2023-05-16 NOTE — PROGRESS NOTES
"Chief Complaint  Annual Exam    Subjective       Sandeep Beavers presents to [facility]    HPI     Patient is doing well. Pt notes dysphagia has resolved after esophageal dilation and weekly allergy shots. Takes Protonix and zyrtec daily. Pt had appointment with GI in 08/22, but was cancelled. Pt was wondering if he should keep appointment and if he should keep taking Protonix.     Patient tries to walk and ride elliptical a couple times a week.   Patient eats out more than he would like, working on it.   Up to date with dental exam   Up to date with eye exam.   Doing well with mental health     Reports chest pain off and on when eating. Last a few minutes and self resolves. Seems to happen more with greasy food. Pain Does not radiate, denies nausea, vomting, diaphoresis. Last episode happened last week.   No family history of heart attacks or strokes  No family history of prostate or colon cancer   Patient has a \"white bump\" on testicle. Denies growth, has been there 6 months. On the skin.     Denies SOA, hematuria, hematochezia.     Objective     Vitals:    05/16/23 0906   BP: 124/74   Pulse: 86   Temp: 98.7 °F (37.1 °C)   TempSrc: Temporal   SpO2: 97%   Weight: 76.7 kg (169 lb)   Height: 177.8 cm (70\")      Wt Readings from Last 3 Encounters:   05/16/23 76.7 kg (169 lb)   07/15/22 78 kg (172 lb)   04/15/22 77.1 kg (170 lb)      BP Readings from Last 3 Encounters:   05/16/23 124/74   07/15/22 138/100   04/15/22 122/72        Body mass index is 24.25 kg/m².    BMI is within normal parameters. No other follow-up for BMI required.       Physical Exam  Constitutional:       Appearance: Normal appearance.   HENT:      Head: Normocephalic and atraumatic.      Right Ear: Tympanic membrane, ear canal and external ear normal.      Left Ear: Tympanic membrane, ear canal and external ear normal.      Nose: Nose normal.      Mouth/Throat:      Mouth: Mucous membranes are " moist.      Pharynx: Oropharynx is clear. No posterior oropharyngeal erythema.   Eyes:      Conjunctiva/sclera: Conjunctivae normal.   Cardiovascular:      Rate and Rhythm: Normal rate and regular rhythm.      Pulses: Normal pulses.      Heart sounds: Normal heart sounds.   Pulmonary:      Effort: Pulmonary effort is normal.      Breath sounds: Normal breath sounds.   Abdominal:      General: Abdomen is flat. There is no distension.      Palpations: Abdomen is soft.      Tenderness: There is no abdominal tenderness. There is no guarding.   Musculoskeletal:      Comments: No tenderness to palpation of sternum or chest    Lymphadenopathy:      Cervical: No cervical adenopathy.   Skin:     General: Skin is warm and dry.      Comments: 3mm cyst of right testicle, mobile and non tender   Neurological:      Mental Status: He is alert and oriented to person, place, and time.   Psychiatric:         Mood and Affect: Mood normal.         Behavior: Behavior normal.         Thought Content: Thought content normal.         Judgment: Judgment normal.              ECG 12 Lead    Date/Time: 5/16/2023 1:38 PM  Performed by: Gianna Mcnamara PA-C  Authorized by: Debbi Preston MD   Previous ECG: no previous ECG available  Rhythm: sinus rhythm  Rate: normal  Conduction: conduction normal  ST Elevation: I and II  QRS axis: normal  Other findings: non-specific ST-T wave changes    Clinical impression: non-specific ECG  Comments: Probable normal early repolarization pattern.            Assessment and Plan   Diagnoses and all orders for this visit:    1. Annual physical exam (Primary)  Assessment & Plan:  Discussed with patient medical history, encouraged patient to continue exercising and healthy diet. Up to date with dental and vision exam.   -Vitals WNR  -Up to date on immunizations   -Up to date on screening      Orders:  -     Comprehensive Metabolic Panel  -     CBC & Differential  -     TSH  -     Lipid Panel    2. Screening,  lipid  -     Lipid Panel    3. Gastroesophageal reflux disease without esophagitis  Assessment & Plan:  -Patient has been on PPI for one year, doing well. Can discontinue. If symptoms return then can consider re-starting PPI.         4. Chest pain, unspecified type  Assessment & Plan:  -Will do EKG in office   -Likely secondary to heartburn. Will monitor, could potentially get worse with discontinuation of PPI.  -Will obtain troponin levels to rule out cardiac stress.    Orders:  -     ECG 12 Lead  -     High Sensitivity Troponin T    5. Epidermal inclusion cyst  Comments:  of testicle, cont to monitor will let us know if growing    6. Pharyngeal dysphagia  Assessment & Plan:  Resolved, well controlled         Other orders  -     pantoprazole (PROTONIX) 20 MG EC tablet; Take 1 tablet by mouth Daily.  Dispense: 90 tablet; Refill: 1        Follow Up   Return in about 1 year (around 5/16/2024).  Patient was given instructions and counseling regarding his condition or for health maintenance advice. Please see specific information pulled into the AVS if appropriate.

## 2023-05-16 NOTE — ASSESSMENT & PLAN NOTE
-Patient has been on PPI for one year, doing well. Can discontinue. If symptoms return then can consider re-starting PPI.

## 2023-10-20 ENCOUNTER — PRIOR AUTHORIZATION (OUTPATIENT)
Dept: INTERNAL MEDICINE | Facility: CLINIC | Age: 38
End: 2023-10-20
Payer: COMMERCIAL

## 2023-10-20 NOTE — TELEPHONE ENCOUNTER
Pa started for pantoprazole via cover my meds    Pa approved     This is to inform you that your Prior Authorization request for the above member’s Pantoprazole  Sodium 20MG OR TBEC has been approved. If you are changing the member's therapy the  previously approved therapy will be canceled and replaced.  The authorization is valid from 09/20/2023 through 10/19/2024.

## 2024-05-17 ENCOUNTER — OFFICE VISIT (OUTPATIENT)
Dept: INTERNAL MEDICINE | Facility: CLINIC | Age: 39
End: 2024-05-17
Payer: COMMERCIAL

## 2024-05-17 VITALS
HEIGHT: 70 IN | WEIGHT: 172.6 LBS | TEMPERATURE: 97.3 F | RESPIRATION RATE: 18 BRPM | OXYGEN SATURATION: 100 % | DIASTOLIC BLOOD PRESSURE: 78 MMHG | BODY MASS INDEX: 24.71 KG/M2 | HEART RATE: 82 BPM | SYSTOLIC BLOOD PRESSURE: 116 MMHG

## 2024-05-17 DIAGNOSIS — M79.671 PAIN IN BOTH FEET: ICD-10-CM

## 2024-05-17 DIAGNOSIS — R53.83 OTHER FATIGUE: ICD-10-CM

## 2024-05-17 DIAGNOSIS — K64.9 HEMORRHOIDS, UNSPECIFIED HEMORRHOID TYPE: ICD-10-CM

## 2024-05-17 DIAGNOSIS — Z13.220 SCREENING, LIPID: ICD-10-CM

## 2024-05-17 DIAGNOSIS — M79.672 PAIN IN BOTH FEET: ICD-10-CM

## 2024-05-17 DIAGNOSIS — Z00.00 ANNUAL PHYSICAL EXAM: ICD-10-CM

## 2024-05-17 DIAGNOSIS — K21.9 GASTROESOPHAGEAL REFLUX DISEASE WITHOUT ESOPHAGITIS: Primary | ICD-10-CM

## 2024-05-17 LAB
ALBUMIN SERPL-MCNC: 4.8 G/DL (ref 3.5–5.2)
ALBUMIN/GLOB SERPL: 1.9 G/DL
ALP SERPL-CCNC: 64 U/L (ref 39–117)
ALT SERPL W P-5'-P-CCNC: 16 U/L (ref 1–41)
ANION GAP SERPL CALCULATED.3IONS-SCNC: 6.9 MMOL/L (ref 5–15)
AST SERPL-CCNC: 20 U/L (ref 1–40)
BASOPHILS # BLD AUTO: 0.09 10*3/MM3 (ref 0–0.2)
BASOPHILS NFR BLD AUTO: 1.6 % (ref 0–1.5)
BILIRUB SERPL-MCNC: 0.5 MG/DL (ref 0–1.2)
BUN SERPL-MCNC: 12 MG/DL (ref 6–20)
BUN/CREAT SERPL: 9.2 (ref 7–25)
CALCIUM SPEC-SCNC: 9.9 MG/DL (ref 8.6–10.5)
CHLORIDE SERPL-SCNC: 104 MMOL/L (ref 98–107)
CHOLEST SERPL-MCNC: 168 MG/DL (ref 0–200)
CO2 SERPL-SCNC: 29.1 MMOL/L (ref 22–29)
CREAT SERPL-MCNC: 1.3 MG/DL (ref 0.76–1.27)
DEPRECATED RDW RBC AUTO: 38.2 FL (ref 37–54)
EGFRCR SERPLBLD CKD-EPI 2021: 71.2 ML/MIN/1.73
EOSINOPHIL # BLD AUTO: 0.08 10*3/MM3 (ref 0–0.4)
EOSINOPHIL NFR BLD AUTO: 1.4 % (ref 0.3–6.2)
ERYTHROCYTE [DISTWIDTH] IN BLOOD BY AUTOMATED COUNT: 11.8 % (ref 12.3–15.4)
GLOBULIN UR ELPH-MCNC: 2.5 GM/DL
GLUCOSE SERPL-MCNC: 96 MG/DL (ref 65–99)
HCT VFR BLD AUTO: 46 % (ref 37.5–51)
HDLC SERPL-MCNC: 58 MG/DL (ref 40–60)
HGB BLD-MCNC: 15.6 G/DL (ref 13–17.7)
IMM GRANULOCYTES # BLD AUTO: 0.01 10*3/MM3 (ref 0–0.05)
IMM GRANULOCYTES NFR BLD AUTO: 0.2 % (ref 0–0.5)
LDLC SERPL CALC-MCNC: 98 MG/DL (ref 0–100)
LDLC/HDLC SERPL: 1.68 {RATIO}
LYMPHOCYTES # BLD AUTO: 1.62 10*3/MM3 (ref 0.7–3.1)
LYMPHOCYTES NFR BLD AUTO: 29.1 % (ref 19.6–45.3)
MCH RBC QN AUTO: 30.3 PG (ref 26.6–33)
MCHC RBC AUTO-ENTMCNC: 33.9 G/DL (ref 31.5–35.7)
MCV RBC AUTO: 89.3 FL (ref 79–97)
MONOCYTES # BLD AUTO: 0.38 10*3/MM3 (ref 0.1–0.9)
MONOCYTES NFR BLD AUTO: 6.8 % (ref 5–12)
NEUTROPHILS NFR BLD AUTO: 3.38 10*3/MM3 (ref 1.7–7)
NEUTROPHILS NFR BLD AUTO: 60.9 % (ref 42.7–76)
NRBC BLD AUTO-RTO: 0 /100 WBC (ref 0–0.2)
PLATELET # BLD AUTO: 268 10*3/MM3 (ref 140–450)
PMV BLD AUTO: 10.4 FL (ref 6–12)
POTASSIUM SERPL-SCNC: 5 MMOL/L (ref 3.5–5.2)
PROT SERPL-MCNC: 7.3 G/DL (ref 6–8.5)
RBC # BLD AUTO: 5.15 10*6/MM3 (ref 4.14–5.8)
SODIUM SERPL-SCNC: 140 MMOL/L (ref 136–145)
TRIGL SERPL-MCNC: 63 MG/DL (ref 0–150)
TSH SERPL DL<=0.05 MIU/L-ACNC: 1.88 UIU/ML (ref 0.27–4.2)
URATE SERPL-MCNC: 6.1 MG/DL (ref 3.4–7)
VLDLC SERPL-MCNC: 12 MG/DL (ref 5–40)
WBC NRBC COR # BLD AUTO: 5.56 10*3/MM3 (ref 3.4–10.8)

## 2024-05-17 PROCEDURE — 80050 GENERAL HEALTH PANEL: CPT | Performed by: INTERNAL MEDICINE

## 2024-05-17 PROCEDURE — 84550 ASSAY OF BLOOD/URIC ACID: CPT | Performed by: INTERNAL MEDICINE

## 2024-05-17 PROCEDURE — 80061 LIPID PANEL: CPT | Performed by: INTERNAL MEDICINE

## 2024-05-17 RX ORDER — HYDROCORTISONE 25 MG/G
CREAM TOPICAL 2 TIMES DAILY
Qty: 28 G | Refills: 1 | Status: SHIPPED | OUTPATIENT
Start: 2024-05-17

## 2024-05-17 NOTE — ASSESSMENT & PLAN NOTE
The patient is advised to take Protonix daily for a period of 3 months if he has a  flare-up. Following this, the frequency of Protonix use will be reduced, with occasional use as needed after. Dietary modifications were recommended.

## 2024-05-17 NOTE — PROGRESS NOTES
"Chief Complaint  Annual Exam      Subjective      History of Present Illness  The patient is a 39-year-old male who presents for evaluation of multiple medical concerns.    The patient reports experiencing discomfort in his feet, and his back pain, particularly after prolonged standing, nothing red hot or swollen he notices this mostly since turning 39.    The patient continues to experience intermittent rectal bleeding, which he attributes to hemorrhoids. He experiences bleeding upon wiping. His bowel movements are regular, typically soft, and he denies straining during defecation.  Reviewed colonoscopy with him that showed 1 sessile polyp and hemorrhoids.    The patient occasionally takes Protonix, lasting a few weeks, to manage his gastritis. His last prescription was issued in 02/2024.      He denies any chest discomfort or trouble breathing. He denies any urinary issues. He denies any leg swelling. He eats pretty well. He has been going to the gym quite a bit for exercise. He denies any joint swelling or stomach issues. His allergies are doing good.   He sees a dentist regularly.   He works as a .   He denies any family history of colon cancer or prostate cancer. He denies any family history of gout.         Objective   Vital Signs:   Vitals:    05/17/24 0813   BP: 116/78   BP Location: Right arm   Patient Position: Sitting   Cuff Size: Adult   Pulse: 82   Resp: 18   Temp: 97.3 °F (36.3 °C)   TempSrc: Temporal   SpO2: 100%   Weight: 78.3 kg (172 lb 9.6 oz)   Height: 177.8 cm (70\")     Body mass index is 24.77 kg/m².    Wt Readings from Last 3 Encounters:   05/17/24 78.3 kg (172 lb 9.6 oz)   05/16/23 76.7 kg (169 lb)   07/15/22 78 kg (172 lb)     BP Readings from Last 3 Encounters:   05/17/24 116/78   05/16/23 124/74   07/15/22 138/100       Health Maintenance   Topic Date Due    ANNUAL PHYSICAL  05/16/2024    COVID-19 Vaccine (3 - 2023-24 season) 10/01/2024 (Originally 9/1/2023)    INFLUENZA VACCINE  " 08/01/2024    COLORECTAL CANCER SCREENING  03/18/2027    TDAP/TD VACCINES (2 - Td or Tdap) 01/01/2029    HEPATITIS C SCREENING  Completed    Pneumococcal Vaccine 0-64  Aged Out       Physical Exam  Vitals reviewed.   Constitutional:       Appearance: Normal appearance. He is well-developed.   HENT:      Head: Normocephalic and atraumatic.      Right Ear: External ear normal.      Left Ear: External ear normal.   Eyes:      Conjunctiva/sclera: Conjunctivae normal.      Pupils: Pupils are equal, round, and reactive to light.   Cardiovascular:      Rate and Rhythm: Normal rate and regular rhythm.      Heart sounds: No murmur heard.     No friction rub. No gallop.   Pulmonary:      Effort: Pulmonary effort is normal.      Breath sounds: Normal breath sounds. No wheezing or rhonchi.   Skin:     General: Skin is warm and dry.   Neurological:      Mental Status: He is alert and oriented to person, place, and time.   Psychiatric:         Mood and Affect: Affect normal.         Behavior: Behavior normal.         Thought Content: Thought content normal.        Physical Exam        Result Review :  The following data was reviewed by: Debbi Preston MD on 05/17/2024:         Results  Imaging  Colonoscopy showed a few hemorrhoids and internal hemorrhoids. A 4 mm sessile polyp in the ascending colon.    BMI is within normal parameters. No other follow-up for BMI required.       Procedures            Assessment & Plan  Gastroesophageal reflux disease without esophagitis  The patient is advised to take Protonix daily for a period of 3 months if he has a  flare-up. Following this, the frequency of Protonix use will be reduced, with occasional use as needed after. Dietary modifications were recommended.  Hemorrhoids, unspecified hemorrhoid type  The patient's colonoscopy revealed several hemorrhoids and internal hemorrhoids, but no anorectal abnormalities. A 4 mm sessile polyp was identified in the ascending colon. The patient  was advised to manage constipation, even if it is mild, and take Colace, senna, or MiraLAX if straining is required. A rectal steroid cream was prescribed, to be applied twice daily. The patient will be scheduled for a colonoscopy in 03/2027.  Other fatigue    Annual physical exam    Screening, lipid    Pain in both feet  The patient was advised to utilize high-arch shoes for foot protection. In the event of joint pain, the patient is advised to take Tylenol or low-dose ibuprofen as needed. If joint swelling or redness occur, laboratory work will be conducted to investigate potential autoimmune causes.  Orders Placed This Encounter   Procedures    Comprehensive Metabolic Panel    TSH    Lipid Panel    Uric acid    CBC & Differential     New Medications Ordered This Visit   Medications    Hydrocortisone, Perianal, (ANUSOL-HC) 2.5 % rectal cream     Sig: Insert  into the rectum 2 (Two) Times a Day.     Dispense:  28 g     Refill:  1          Assessment & Plan      Patient or patient representative verbalized consent for the use of Ambient Listening during the visit with  Debbi Preston MD for chart documentation. 5/17/2024  09:15 EDT      FOLLOW UP  No follow-ups on file.  Patient was given instructions and counseling regarding his condition or for health maintenance advice. Please see specific information pulled into the AVS if appropriate.     Debbi Preston MD  05/17/24  09:09 EDT    CURRENT & DISCONTINUED MEDICATIONS  Current Outpatient Medications   Medication Instructions    cetirizine (ZYRTEC) 10 mg, Oral, Daily    Hydrocortisone, Perianal, (ANUSOL-HC) 2.5 % rectal cream Rectal, 2 Times Daily    pantoprazole (PROTONIX) 20 mg, Oral, Daily       There are no discontinued medications.

## 2024-05-20 ENCOUNTER — LAB (OUTPATIENT)
Dept: LAB | Facility: HOSPITAL | Age: 39
End: 2024-05-20
Payer: COMMERCIAL

## 2024-05-20 DIAGNOSIS — M79.671 PAIN IN BOTH FEET: ICD-10-CM

## 2024-05-20 DIAGNOSIS — R53.83 OTHER FATIGUE: ICD-10-CM

## 2024-05-20 DIAGNOSIS — M79.672 PAIN IN BOTH FEET: ICD-10-CM

## 2024-05-20 DIAGNOSIS — K21.9 GASTROESOPHAGEAL REFLUX DISEASE WITHOUT ESOPHAGITIS: ICD-10-CM

## 2024-05-20 DIAGNOSIS — K64.9 HEMORRHOIDS, UNSPECIFIED HEMORRHOID TYPE: ICD-10-CM

## 2024-05-20 DIAGNOSIS — Z13.220 SCREENING, LIPID: ICD-10-CM

## 2024-05-20 DIAGNOSIS — Z00.00 ANNUAL PHYSICAL EXAM: ICD-10-CM

## 2024-05-20 LAB
25(OH)D3 SERPL-MCNC: 24.1 NG/ML (ref 30–100)
IRON 24H UR-MRATE: 77 MCG/DL (ref 59–158)
IRON SATN MFR SERPL: 18 % (ref 20–50)
MAGNESIUM SERPL-MCNC: 2.1 MG/DL (ref 1.6–2.6)
TIBC SERPL-MCNC: 419 MCG/DL (ref 298–536)
TRANSFERRIN SERPL-MCNC: 281 MG/DL (ref 200–360)
VIT B12 BLD-MCNC: 513 PG/ML (ref 211–946)

## 2024-05-20 PROCEDURE — 84466 ASSAY OF TRANSFERRIN: CPT

## 2024-05-20 PROCEDURE — 83540 ASSAY OF IRON: CPT

## 2024-05-20 PROCEDURE — 82306 VITAMIN D 25 HYDROXY: CPT

## 2024-05-20 PROCEDURE — 82607 VITAMIN B-12: CPT

## 2024-05-20 PROCEDURE — 36415 COLL VENOUS BLD VENIPUNCTURE: CPT

## 2024-05-20 PROCEDURE — 83735 ASSAY OF MAGNESIUM: CPT

## 2024-06-11 ENCOUNTER — PATIENT MESSAGE (OUTPATIENT)
Dept: INTERNAL MEDICINE | Facility: CLINIC | Age: 39
End: 2024-06-11
Payer: COMMERCIAL

## 2024-06-11 DIAGNOSIS — R31.9 HEMATURIA, UNSPECIFIED TYPE: Primary | ICD-10-CM

## 2024-06-15 NOTE — TELEPHONE ENCOUNTER
From: Sandeep Beavers  To: Debbi Preston  Sent: 6/11/2024 1:33 PM EDT  Subject: Sandeep Beavers    Good afternoon,  This past weekend I experienced what appeared to be blood clots in my urine once on Saturday and once on Sunday. The one Saturday was two little looking clots and Sunday was one. I have not seen any since the one Sunday. No pain or trouble using the bathroom. Just wanted to reach out to you and see what you recommend. Thanks

## 2024-07-01 ENCOUNTER — HOSPITAL ENCOUNTER (OUTPATIENT)
Dept: CT IMAGING | Facility: HOSPITAL | Age: 39
Discharge: HOME | End: 2024-07-01
Admitting: INTERNAL MEDICINE
Payer: COMMERCIAL

## 2024-07-01 DIAGNOSIS — R31.9 HEMATURIA, UNSPECIFIED TYPE: ICD-10-CM

## 2024-07-01 PROCEDURE — 74176 CT ABD & PELVIS W/O CONTRAST: CPT

## 2024-07-03 ENCOUNTER — LAB (OUTPATIENT)
Dept: LAB | Facility: HOSPITAL | Age: 39
End: 2024-07-03
Payer: COMMERCIAL

## 2024-07-03 DIAGNOSIS — R31.9 HEMATURIA, UNSPECIFIED TYPE: ICD-10-CM

## 2024-07-03 LAB
BILIRUB UR QL STRIP: NEGATIVE
CLARITY UR: CLEAR
COLOR UR: YELLOW
GLUCOSE UR STRIP-MCNC: NEGATIVE MG/DL
HGB UR QL STRIP.AUTO: NEGATIVE
HOLD SPECIMEN: NORMAL
KETONES UR QL STRIP: NEGATIVE
LEUKOCYTE ESTERASE UR QL STRIP.AUTO: NEGATIVE
NITRITE UR QL STRIP: NEGATIVE
PH UR STRIP.AUTO: 5.5 [PH] (ref 5–8)
PROT UR QL STRIP: NEGATIVE
SP GR UR STRIP: 1.02 (ref 1–1.03)
UROBILINOGEN UR QL STRIP: NORMAL

## 2024-07-03 PROCEDURE — 36415 COLL VENOUS BLD VENIPUNCTURE: CPT

## 2024-07-03 PROCEDURE — 84153 ASSAY OF PSA TOTAL: CPT

## 2024-07-03 PROCEDURE — 81003 URINALYSIS AUTO W/O SCOPE: CPT

## 2024-07-04 LAB — PSA SERPL-MCNC: 0.87 NG/ML (ref 0–4)

## 2024-07-06 DIAGNOSIS — R91.1 LUNG NODULE: Primary | ICD-10-CM

## 2024-08-23 DIAGNOSIS — R31.9 HEMATURIA, UNSPECIFIED TYPE: Primary | ICD-10-CM

## 2024-09-13 RX ORDER — PANTOPRAZOLE SODIUM 20 MG/1
20 TABLET, DELAYED RELEASE ORAL DAILY
Qty: 90 TABLET | Refills: 1 | Status: SHIPPED | OUTPATIENT
Start: 2024-09-13

## 2025-02-17 ENCOUNTER — PATIENT MESSAGE (OUTPATIENT)
Dept: INTERNAL MEDICINE | Facility: CLINIC | Age: 40
End: 2025-02-17
Payer: COMMERCIAL

## 2025-02-18 RX ORDER — FAMOTIDINE 40 MG/1
40 TABLET, FILM COATED ORAL DAILY
Qty: 90 TABLET | Refills: 1 | Status: SHIPPED | OUTPATIENT
Start: 2025-02-18

## 2025-05-23 ENCOUNTER — OFFICE VISIT (OUTPATIENT)
Dept: INTERNAL MEDICINE | Facility: CLINIC | Age: 40
End: 2025-05-23
Payer: COMMERCIAL

## 2025-05-23 VITALS
DIASTOLIC BLOOD PRESSURE: 72 MMHG | RESPIRATION RATE: 18 BRPM | SYSTOLIC BLOOD PRESSURE: 118 MMHG | HEART RATE: 86 BPM | TEMPERATURE: 97.5 F | HEIGHT: 70 IN | BODY MASS INDEX: 24.74 KG/M2 | OXYGEN SATURATION: 97 % | WEIGHT: 172.8 LBS

## 2025-05-23 DIAGNOSIS — Z13.220 SCREENING, LIPID: ICD-10-CM

## 2025-05-23 DIAGNOSIS — R53.83 OTHER FATIGUE: ICD-10-CM

## 2025-05-23 DIAGNOSIS — Z00.00 ANNUAL PHYSICAL EXAM: Primary | ICD-10-CM

## 2025-05-23 DIAGNOSIS — R91.1 LUNG NODULE: ICD-10-CM

## 2025-05-23 LAB
25(OH)D3 SERPL-MCNC: 29.5 NG/ML (ref 30–100)
ALBUMIN SERPL-MCNC: 4.8 G/DL (ref 3.5–5.2)
ALBUMIN/GLOB SERPL: 1.6 G/DL
ALP SERPL-CCNC: 82 U/L (ref 39–117)
ALT SERPL W P-5'-P-CCNC: 14 U/L (ref 1–41)
ANION GAP SERPL CALCULATED.3IONS-SCNC: 11.1 MMOL/L (ref 5–15)
AST SERPL-CCNC: 16 U/L (ref 1–40)
BASOPHILS # BLD AUTO: 0.08 10*3/MM3 (ref 0–0.2)
BASOPHILS NFR BLD AUTO: 1.2 % (ref 0–1.5)
BILIRUB SERPL-MCNC: 0.4 MG/DL (ref 0–1.2)
BUN SERPL-MCNC: 13 MG/DL (ref 6–20)
BUN/CREAT SERPL: 12 (ref 7–25)
CALCIUM SPEC-SCNC: 10 MG/DL (ref 8.6–10.5)
CHLORIDE SERPL-SCNC: 101 MMOL/L (ref 98–107)
CHOLEST SERPL-MCNC: 158 MG/DL (ref 0–200)
CO2 SERPL-SCNC: 25.9 MMOL/L (ref 22–29)
CREAT SERPL-MCNC: 1.08 MG/DL (ref 0.76–1.27)
DEPRECATED RDW RBC AUTO: 41.4 FL (ref 37–54)
EGFRCR SERPLBLD CKD-EPI 2021: 88.4 ML/MIN/1.73
EOSINOPHIL # BLD AUTO: 0.16 10*3/MM3 (ref 0–0.4)
EOSINOPHIL NFR BLD AUTO: 2.4 % (ref 0.3–6.2)
ERYTHROCYTE [DISTWIDTH] IN BLOOD BY AUTOMATED COUNT: 12.4 % (ref 12.3–15.4)
GLOBULIN UR ELPH-MCNC: 3 GM/DL
GLUCOSE SERPL-MCNC: 83 MG/DL (ref 65–99)
HCT VFR BLD AUTO: 48.1 % (ref 37.5–51)
HDLC SERPL-MCNC: 47 MG/DL (ref 40–60)
HGB BLD-MCNC: 15.9 G/DL (ref 13–17.7)
IMM GRANULOCYTES # BLD AUTO: 0.03 10*3/MM3 (ref 0–0.05)
IMM GRANULOCYTES NFR BLD AUTO: 0.4 % (ref 0–0.5)
LDLC SERPL CALC-MCNC: 96 MG/DL (ref 0–100)
LDLC/HDLC SERPL: 2.03 {RATIO}
LYMPHOCYTES # BLD AUTO: 2.05 10*3/MM3 (ref 0.7–3.1)
LYMPHOCYTES NFR BLD AUTO: 30.6 % (ref 19.6–45.3)
MCH RBC QN AUTO: 30.2 PG (ref 26.6–33)
MCHC RBC AUTO-ENTMCNC: 33.1 G/DL (ref 31.5–35.7)
MCV RBC AUTO: 91.3 FL (ref 79–97)
MONOCYTES # BLD AUTO: 0.42 10*3/MM3 (ref 0.1–0.9)
MONOCYTES NFR BLD AUTO: 6.3 % (ref 5–12)
NEUTROPHILS NFR BLD AUTO: 3.96 10*3/MM3 (ref 1.7–7)
NEUTROPHILS NFR BLD AUTO: 59.1 % (ref 42.7–76)
NRBC BLD AUTO-RTO: 0 /100 WBC (ref 0–0.2)
PLATELET # BLD AUTO: 301 10*3/MM3 (ref 140–450)
PMV BLD AUTO: 10.5 FL (ref 6–12)
POTASSIUM SERPL-SCNC: 4.4 MMOL/L (ref 3.5–5.2)
PROT SERPL-MCNC: 7.8 G/DL (ref 6–8.5)
RBC # BLD AUTO: 5.27 10*6/MM3 (ref 4.14–5.8)
SODIUM SERPL-SCNC: 138 MMOL/L (ref 136–145)
TRIGL SERPL-MCNC: 79 MG/DL (ref 0–150)
TSH SERPL DL<=0.05 MIU/L-ACNC: 1.75 UIU/ML (ref 0.27–4.2)
VLDLC SERPL-MCNC: 15 MG/DL (ref 5–40)
WBC NRBC COR # BLD AUTO: 6.7 10*3/MM3 (ref 3.4–10.8)

## 2025-05-23 PROCEDURE — 80061 LIPID PANEL: CPT | Performed by: INTERNAL MEDICINE

## 2025-05-23 PROCEDURE — 80050 GENERAL HEALTH PANEL: CPT | Performed by: INTERNAL MEDICINE

## 2025-05-23 PROCEDURE — 82306 VITAMIN D 25 HYDROXY: CPT | Performed by: INTERNAL MEDICINE

## 2025-05-23 NOTE — PROGRESS NOTES
"Chief Complaint  Annual Exam (Requesting eye exam)      Subjective      History of Present Illness  The patient presents for a routine checkup.    He reports general well-being, no chest pain or respiratory distress. Rectal bleeding has improved, and heartburn is managed. He takes multivitamins. Fatigue is attributed to his late-night work schedule, returning home at 11:00 PM and rising at 5:00 AM, but maintains good sleep quality during the 6 hours he rests. He snores. No urinary issues. Interested in an eye examination.    History of ear infections and receiving allergy injections for 3 years, currently on maintenance dose every 2 weeks. CT chest scheduled for 06/16/2025 for follow up of lung nodule.    FAMILY HISTORY  - No family history of colon or prostate cancer  - Maternal grandfather had lung cancer and was a smoker  - Father has sleep apnea and is overweight         Objective   Vital Signs:   Vitals:    05/23/25 0813   BP: 118/72   BP Location: Left arm   Patient Position: Sitting   Cuff Size: Adult   Pulse: 86   Resp: 18   Temp: 97.5 °F (36.4 °C)   TempSrc: Temporal   SpO2: 97%   Weight: 78.4 kg (172 lb 12.8 oz)   Height: 177.8 cm (70\")     Body mass index is 24.79 kg/m².    Wt Readings from Last 3 Encounters:   05/23/25 78.4 kg (172 lb 12.8 oz)   05/17/24 78.3 kg (172 lb 9.6 oz)   05/16/23 76.7 kg (169 lb)     BP Readings from Last 3 Encounters:   05/23/25 118/72   05/17/24 116/78   05/16/23 124/74       Health Maintenance   Topic Date Due    COVID-19 Vaccine (3 - 2024-25 season) 09/01/2024    ANNUAL PHYSICAL  05/17/2025    INFLUENZA VACCINE  07/01/2025    COLORECTAL CANCER SCREENING  03/18/2027    TDAP/TD VACCINES (2 - Td or Tdap) 01/01/2029    HEPATITIS C SCREENING  Completed    Pneumococcal Vaccine 0-49  Aged Out       Physical Exam  Vitals reviewed.   Constitutional:       Appearance: Normal appearance. He is well-developed.   HENT:      Head: Normocephalic and atraumatic.      Right Ear: External ear " normal.      Left Ear: External ear normal.   Eyes:      Conjunctiva/sclera: Conjunctivae normal.      Pupils: Pupils are equal, round, and reactive to light.   Cardiovascular:      Rate and Rhythm: Normal rate and regular rhythm.      Heart sounds: No murmur heard.     No friction rub. No gallop.   Pulmonary:      Effort: Pulmonary effort is normal.      Breath sounds: Normal breath sounds. No wheezing or rhonchi.   Skin:     General: Skin is warm and dry.   Neurological:      Mental Status: He is alert and oriented to person, place, and time.   Psychiatric:         Mood and Affect: Affect normal.         Behavior: Behavior normal.         Thought Content: Thought content normal.        Physical Exam  Ears: Chronic mucus present  Mouth/Throat: Mallampati score of 4      Result Review :  The following data was reviewed by: Debbi Preston MD on 05/23/2025:         Results  - Labs:    - Vitamin D: Slightly low    - Iron: Slightly low    - Magnesium: Normal    - B12: Normal    - Uric acid: Normal    - Creatinine: Slightly elevated    - Thyroid function: Normal    - Cholesterol: Normal    - PSA level: Normal    - Imaging:    - Abdominal CT (06/2024): 4 mm nodule in lung base    BMI is within normal parameters. No other follow-up for BMI required.       Procedures            Assessment & Plan  Annual physical exam         Other fatigue    Orders:    Comprehensive Metabolic Panel    CBC & Differential    TSH Rfx On Abnormal To Free T4; Future    Vitamin D 25 hydroxy; Future    Screening, lipid    Orders:    Lipid Panel    Lung nodule              Assessment & Plan  Health maintenance:  - BP and weight within normal range  - Labs: slight deficiency in vitamin D and iron, normal magnesium, B12, uric acid, thyroid function, cholesterol, and PSA levels  - CMP, thyroid level, cholesterol test, and repeat vitamin D test today  - PSA recheck next year  -mental health is good    Fatigue:  - Fatigue linked to sleep patterns,  possible sleep apnea  - Mallampati score 3 suggests higher risk  - Advised 10 minutes of light cardiovascular exercise daily and minimum 6 hours of sleep  - Consider sleep apnea test if fatigue persists    Allergies:  - Continue bi-weekly allergy injections through spring  - Transition to monthly by winter    Lung nodule:  - Follow-up CT chest scheduled for 06/16/2025 to monitor 4 mm nodule in lung base    Patient or patient representative verbalized consent for the use of Ambient Listening during the visit with  Debbi Preston MD for chart documentation. 5/23/2025  09:41 EDT      FOLLOW UP  No follow-ups on file.  Patient was given instructions and counseling regarding his condition or for health maintenance advice. Please see specific information pulled into the AVS if appropriate.     Debbi Preston MD  05/23/25  09:41 EDT    CURRENT & DISCONTINUED MEDICATIONS  Current Outpatient Medications   Medication Instructions    cetirizine (ZYRTEC) 10 mg, Daily    famotidine (PEPCID) 40 mg, Oral, Daily       Medications Discontinued During This Encounter   Medication Reason    Hydrocortisone, Perianal, (ANUSOL-HC) 2.5 % rectal cream

## 2025-06-24 RX ORDER — FAMOTIDINE 40 MG/1
40 TABLET, FILM COATED ORAL DAILY
Qty: 90 TABLET | Refills: 1 | Status: SHIPPED | OUTPATIENT
Start: 2025-06-24

## 2025-07-04 DIAGNOSIS — R91.1 LUNG NODULE: Primary | ICD-10-CM

## 2025-07-25 ENCOUNTER — HOSPITAL ENCOUNTER (OUTPATIENT)
Dept: CT IMAGING | Facility: HOSPITAL | Age: 40
Discharge: HOME | End: 2025-07-25
Admitting: INTERNAL MEDICINE
Payer: COMMERCIAL

## 2025-07-25 DIAGNOSIS — R91.1 LUNG NODULE: ICD-10-CM

## 2025-07-25 PROCEDURE — 71250 CT THORAX DX C-: CPT

## (undated) DEVICE — Device: Brand: DEFENDO AIR/WATER/SUCTION AND BIOPSY VALVE

## (undated) DEVICE — SOL IRRG H2O PL/BG 1000ML STRL

## (undated) DEVICE — ESOPHAGEAL BALLOON DILATATION CATHETER: Brand: CRE FIXED WIRE

## (undated) DEVICE — EGD OR ERCP KIT: Brand: MEDLINE INDUSTRIES, INC.

## (undated) DEVICE — SINGLE-USE BIOPSY FORCEPS: Brand: RADIAL JAW 4

## (undated) DEVICE — COLON KIT: Brand: MEDLINE INDUSTRIES, INC.

## (undated) DEVICE — DEV INFL CRE STERIFLATE 60CC DISP